# Patient Record
Sex: MALE | Race: WHITE
[De-identification: names, ages, dates, MRNs, and addresses within clinical notes are randomized per-mention and may not be internally consistent; named-entity substitution may affect disease eponyms.]

---

## 2020-07-07 ENCOUNTER — HOSPITAL ENCOUNTER (OUTPATIENT)
Dept: HOSPITAL 19 - COL.RAD | Age: 50
End: 2020-07-07
Payer: COMMERCIAL

## 2020-07-07 DIAGNOSIS — K40.90: Primary | ICD-10-CM

## 2021-01-11 ENCOUNTER — HOSPITAL ENCOUNTER (OUTPATIENT)
Dept: HOSPITAL 19 - SDCO | Age: 51
Discharge: HOME | End: 2021-01-11
Attending: SURGERY
Payer: COMMERCIAL

## 2021-01-11 VITALS — DIASTOLIC BLOOD PRESSURE: 77 MMHG | TEMPERATURE: 96.7 F | SYSTOLIC BLOOD PRESSURE: 140 MMHG | HEART RATE: 71 BPM

## 2021-01-11 VITALS — SYSTOLIC BLOOD PRESSURE: 129 MMHG | DIASTOLIC BLOOD PRESSURE: 69 MMHG | HEART RATE: 69 BPM

## 2021-01-11 VITALS — HEART RATE: 67 BPM | SYSTOLIC BLOOD PRESSURE: 131 MMHG | DIASTOLIC BLOOD PRESSURE: 67 MMHG

## 2021-01-11 VITALS — HEART RATE: 66 BPM | SYSTOLIC BLOOD PRESSURE: 142 MMHG | DIASTOLIC BLOOD PRESSURE: 54 MMHG

## 2021-01-11 VITALS — SYSTOLIC BLOOD PRESSURE: 136 MMHG | TEMPERATURE: 98.4 F | DIASTOLIC BLOOD PRESSURE: 81 MMHG | HEART RATE: 69 BPM

## 2021-01-11 VITALS — BODY MASS INDEX: 34.27 KG/M2 | WEIGHT: 258.6 LBS | HEIGHT: 73 IN

## 2021-01-11 VITALS — HEART RATE: 69 BPM | SYSTOLIC BLOOD PRESSURE: 129 MMHG | DIASTOLIC BLOOD PRESSURE: 51 MMHG

## 2021-01-11 DIAGNOSIS — K40.90: Primary | ICD-10-CM

## 2021-01-11 DIAGNOSIS — Z86.19: ICD-10-CM

## 2021-01-11 DIAGNOSIS — E66.9: ICD-10-CM

## 2021-01-11 PROCEDURE — C1781 MESH (IMPLANTABLE): HCPCS

## 2021-01-11 NOTE — NUR
Discharge instructions given to pt.  All questions answered to pt's
satisfaction.  Handed to him are a thank you card, discharge instructions,
diagnosis information, and information to call the office to set up his
follow-up appt.  The doctor's office was called to notify them that the pt
needed a 2 week follow-up appt and to call the pt to schedule it.

## 2021-01-11 NOTE — NUR
Monitors on and alarms set.  Pt denies nausea and only feels a little dullness
regarding pain.  Pt's right shoulder pain is resolved.  Pt drinking ice water.
 Pt requests crackers and desire to be discharged soon.

## 2021-01-11 NOTE — NUR
Report received from Indy, PACU RN, at bedside in PACU.  Transfer pt via cart
from PACU to [facility] via this RN.